# Patient Record
Sex: MALE | Race: BLACK OR AFRICAN AMERICAN | NOT HISPANIC OR LATINO
[De-identification: names, ages, dates, MRNs, and addresses within clinical notes are randomized per-mention and may not be internally consistent; named-entity substitution may affect disease eponyms.]

---
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
## 2021-01-03 ENCOUNTER — EMERGENCY (EMERGENCY)
Facility: HOSPITAL | Age: 21
LOS: 1 days | Discharge: HOME | End: 2021-01-03
Attending: EMERGENCY MEDICINE | Admitting: EMERGENCY MEDICINE
Payer: COMMERCIAL

## 2021-01-03 VITALS
DIASTOLIC BLOOD PRESSURE: 69 MMHG | TEMPERATURE: 98 F | RESPIRATION RATE: 18 BRPM | HEIGHT: 71 IN | HEART RATE: 57 BPM | OXYGEN SATURATION: 100 % | WEIGHT: 145.06 LBS | SYSTOLIC BLOOD PRESSURE: 106 MMHG

## 2021-01-03 DIAGNOSIS — M25.539 PAIN IN UNSPECIFIED WRIST: ICD-10-CM

## 2021-01-03 PROCEDURE — 99283 EMERGENCY DEPT VISIT LOW MDM: CPT | Mod: 25

## 2021-01-03 PROCEDURE — 29125 APPL SHORT ARM SPLINT STATIC: CPT | Mod: RT

## 2021-01-03 PROCEDURE — 73110 X-RAY EXAM OF WRIST: CPT

## 2021-01-03 PROCEDURE — 29125 APPL SHORT ARM SPLINT STATIC: CPT

## 2021-01-03 PROCEDURE — 73110 X-RAY EXAM OF WRIST: CPT | Mod: 26,RT

## 2021-01-03 PROCEDURE — 99284 EMERGENCY DEPT VISIT MOD MDM: CPT | Mod: 25

## 2021-01-03 NOTE — ED PROCEDURE NOTE - ATTENDING CONTRIBUTION TO CARE
Dr. Johnson: I performed a face to face bedside interview with patient regarding history of present illness, review of symptoms and past medical history. I completed an independent physical exam.  I have discussed patient's plan of care with PA.   I agree with note as stated above, having amended the EMR as needed to reflect my findings.   This includes HISTORY OF PRESENT ILLNESS, HIV, PAST MEDICAL/SURGICAL/FAMILY/SOCIAL HISTORY, ALLERGIES AND HOME MEDICATIONS, REVIEW OF SYSTEMS, PHYSICAL EXAM, and any PROGRESS NOTES during the time I functioned as the attending physician for this patient.

## 2021-01-03 NOTE — ED PROVIDER NOTE - CARE PROVIDER_API CALL
Filiberto Blank  ORTHOPAEDIC SURGERY  [address], [address]  [place] [address]  Phone: [phone]  Fax: [phone]  Follow Up Time:     Eliecer Trevizo  ORTHOPAEDIC SPORTS MEDICINE  [address]  [place] [address]  Phone: [phone]  Fax: [phone]  Follow Up Time:

## 2021-01-03 NOTE — ED PROVIDER NOTE - PATIENT PORTAL LINK FT
You can access the FollowMyHealth Patient Portal offered by [facility] by registering at the following website: [email or web address]. By joining [employer]’s FollowMyHealth portal, you will also be able to view your health information using other applications (apps) compatible with our system.

## 2021-01-03 NOTE — ED PROVIDER NOTE - NSFOLLOWUPINSTRUCTIONS_ED_ALL_ED_FT
Follow up with the orthopedic specialist within 2-3 days, call the number provided for an appointment     Take Tylenol or Motrin for pain. keep your splint clean and dry     Stay hydrated    Return to the ER if your symptoms worsen or for any other medical emergencies  ************      Radial Fracture       A radial fracture is a break in the radius bone. The radius is a bone in the forearm, on the same side as the thumb. The forearm is the part of the arm that is between the elbow and the wrist. A radial fracture near the wrist (distal radialfracture) is the most common type of broken arm. A fracture can also occur near the elbow (radial head fracture).      What are the causes?  The most common cause of a radial fracture is falling with the arm outstretched. Other causes include:  •An accident, such as a car or bike accident.      •A hard, direct hit to the forearm.        What increases the risk?  You may be at greater risk for a radial fracture if you:  •Are female.      •Are an older adult.      •Play contact sports.      •Have a condition that causes your bones to become thin and brittle (osteoporosis).        What are the signs or symptoms?  A radial fracture causes pain immediately after the injury. Other signs and symptoms may include:  •An abnormal bend or bump in the arm (deformity).      •Swelling.      •Bruising.      •Numbness or tingling in your arm and hand.      •Limited movement of your arm and hand.        How is this diagnosed?  This condition may be diagnosed based on:  •Your symptoms and medical history.      •A physical exam.      •An X-ray.        How is this treated?  Treatment depends on how severe your fracture is, where it is, and how the pieces of the broken bone line up with each other (alignment).  •The first step may be for you to wear a temporary splint for a few days, until your swelling goes down. After the swelling goes down, you may get a cast, get a different type of splint, or have surgery.      •If your broken bone is in good alignment, you will need to wear a splint or cast for up to 6 weeks.    •If your broken bone is not aligned (is displaced), your health care provider will need to align the bone pieces. After alignment, you will need to wear a splint or cast for up to 6 weeks. To align your broken bone, your health care provider may:  •Move the bones back into position without surgery (closed reduction).      •Perform surgery to align the fracture and fix the bone pieces into place with metal screws, plates, or wires (open reduction and internal fixation, ORIF).      •Perform surgery to align the fracture and fix the bone pieces into place with pins that are attached to a stabilizing bar outside your skin (external fixation).      Treatment may also include:  •Having your cast changed after 2–3 weeks.      •Physical therapy.      •Follow-up visits and X-rays to make sure you are healing.        Follow these instructions at home:    If you have a splint:     •Wear it as told by your health care provider. Remove it only as told by your health care provider.       •Loosen the splint if your fingers tingle, become numb, or turn cold and blue.       •Keep the splint clean and dry.      If you have a cast:     • Do not stick anything inside the cast to scratch your skin. Doing that increases your risk for infection.       •Check the skin around the cast every day. Tell your health care provider about any concerns.       •You may put lotion on dry skin around the edges of the cast. Do not put lotion on the skin underneath the cast.      •Keep the cast clean and dry.      Bathing     • Do not take baths, swim, or use a hot tub until your health care provider approves. Ask your health care provider if you may take showers. You may only be allowed to take sponge baths.    •If your splint or cast is not waterproof:  •Do not let it get wet.      •Cover it with a watertight covering when you take a bath or a shower.        Activity     •  Do not lift anything with your injured arm.      • Do not use the injured arm to support your body weight until your health care provider says that you can.      •Ask your health care provider what activities are safe for you during recovery, and ask what activities you need to avoid.        Managing pain, stiffness, and swelling    •If directed, put ice on painful areas:   •If you have a removable splint, remove it as told by your health care provider.       •Put ice in a plastic bag.       •Place a towel between your skin and the bag, or between your cast and the bag.      •Leave the ice on for 20 minutes, 2–3 times a day.        • Move your fingers often to avoid stiffness and to lessen swelling.       •Raise (elevate) your arm above the level of your heart while you are sitting or lying down.      General instructions     • Do not put pressure on any part of the cast or splint until it is fully hardened, if applicable. This may take several hours.       •Take over-the-counter and prescription medicines only as told by your health care provider.      •  Do not drive until your health care provider approves. You should not drive or use heavy machinery while taking prescription pain medicine.       • Do not use any products that contain nicotine or tobacco, such as cigarettes and e-cigarettes. These can delay bone healing. If you need help quitting, ask your health care provider.      •Keep all follow-up visits as told by your health care provider. This is important.        Contact a health care provider if you have:    •Pain that does not get better with medicine.      •Swelling that gets worse.      •A bad smell coming from your cast.        Get help right away if:    •You cannot move your fingers.      •You have severe pain.    •Your fingers or your hand:  •Become numb, cold, or pale.      •Turn a bluish color.          Summary    •A radial fracture is a break in the radius bone. The radius is in the forearm, on the same side as the thumb.      •Treatment depends on how severe your fracture is, where it is, and how the pieces of the broken bone line up with each other.      •A splint or cast may be needed to help the fracture heal. A more severe break may require surgery.      This information is not intended to replace advice given to you by your health care provider. Make sure you discuss any questions you have with your health care provider.

## 2021-01-03 NOTE — ED PROVIDER NOTE - ATTENDING CONTRIBUTION TO CARE
Dr. Johnson: I performed a face to face bedside interview with patient regarding history of present illness, review of symptoms and past medical history. I completed an independent physical exam.  I have discussed patient's plan of care with PA.   I agree with note as stated above, having amended the EMR as needed to reflect my findings.   This includes HISTORY OF PRESENT ILLNESS, HIV, PAST MEDICAL/SURGICAL/FAMILY/SOCIAL HISTORY, ALLERGIES AND HOME MEDICATIONS, REVIEW OF SYSTEMS, PHYSICAL EXAM, and any PROGRESS NOTES during the time I functioned as the attending physician for this patient.    see mdm

## 2021-01-03 NOTE — ED PROVIDER NOTE - PHYSICAL EXAMINATION
msk: +mild TTP over the volar aspect of the distal radius. No snuff not TTP. 2+ radial pulses b/l. no neurovasc compromise on exam  FROM of wrist

## 2021-01-03 NOTE — ED ADULT NURSE NOTE - OBJECTIVE STATEMENT
Pt arrives to ER c/o right wrist pain s/p fall snowboarding last Monday, negative deformity or swelling noted, pt able to move injured extremity purposefully

## 2021-01-03 NOTE — ED PROVIDER NOTE - CLINICAL SUMMARY MEDICAL DECISION MAKING FREE TEXT BOX
Dr. Johnson: 20M left hand dominant p/w pain to right distal wrist s/p foosh injury snowboarding last week, no other injuries, does not want anything for pain. On exam pt with mild deformity to right distal radius with ttp, neurovascularly intact, rest of exam unremarkable. Will splint and dc with ortho f/u

## 2021-12-30 NOTE — ED ADULT TRIAGE NOTE - BP NONINVASIVE DIASTOLIC (MM HG)
69 [Feeling Fatigued] : feeling fatigued [SOB] : shortness of breath [Joint Pain] : joint pain [Negative] : Psychiatric

## 2022-04-24 ENCOUNTER — EMERGENCY (EMERGENCY)
Facility: HOSPITAL | Age: 22
LOS: 1 days | Discharge: HOME | End: 2022-04-24
Attending: EMERGENCY MEDICINE | Admitting: EMERGENCY MEDICINE
Payer: SELF-PAY

## 2022-04-24 ENCOUNTER — TRANSCRIPTION ENCOUNTER (OUTPATIENT)
Age: 22
End: 2022-04-24

## 2022-04-24 VITALS
RESPIRATION RATE: 18 BRPM | HEIGHT: 71 IN | DIASTOLIC BLOOD PRESSURE: 72 MMHG | SYSTOLIC BLOOD PRESSURE: 122 MMHG | TEMPERATURE: 100 F | OXYGEN SATURATION: 98 % | WEIGHT: 142.2 LBS | HEART RATE: 78 BPM

## 2022-04-24 PROCEDURE — 96372 THER/PROPH/DIAG INJ SC/IM: CPT

## 2022-04-24 PROCEDURE — 99283 EMERGENCY DEPT VISIT LOW MDM: CPT

## 2022-04-24 PROCEDURE — 99284 EMERGENCY DEPT VISIT MOD MDM: CPT

## 2022-04-24 RX ORDER — KETOROLAC TROMETHAMINE 30 MG/ML
60 SYRINGE (ML) INJECTION ONCE
Refills: 0 | Status: DISCONTINUED | OUTPATIENT
Start: 2022-04-24 | End: 2022-04-24

## 2022-04-24 RX ORDER — DEXAMETHASONE 0.5 MG/5ML
10 ELIXIR ORAL ONCE
Refills: 0 | Status: COMPLETED | OUTPATIENT
Start: 2022-04-24 | End: 2022-04-24

## 2022-04-24 RX ADMIN — Medication 10 MILLIGRAM(S): at 21:16

## 2022-04-24 RX ADMIN — Medication 60 MILLIGRAM(S): at 21:16

## 2022-04-24 NOTE — ED PROVIDER NOTE - OBJECTIVE STATEMENT
22 y/o M with no reported PMH presents to the ED with c/o sorethroat x 4 days, worsened over the last 2 days. Pt went to [facility] today, they did a rapid covid and strep test, which were neg, Pt also was tested for mono, unknown results, he was dc'd with amoxil. Reports he had difficulty swallowing ibuprofen at home. Denies f/c, n/v/d, abd pain, rash, sick contacts or all other complaints

## 2022-04-24 NOTE — ED PROVIDER NOTE - CARE PROVIDERS DIRECT ADDRESSES
[email or web address] [email or web address]

## 2022-04-24 NOTE — ED PROVIDER NOTE - CLINICAL SUMMARY MEDICAL DECISION MAKING FREE TEXT BOX
20 y/o M with no reported PMH presents to the ED with c/o sorethroat x 4 days, worsened over the last 2 days. Pt went to [facility] today, they did a rapid covid and strep test, which were neg, Pt also was tested for mono, unknown results, he was dc'd with amoxil. Reports he had difficulty swallowing ibuprofen at home. Denies f/c, n/v/d, abd pain, rash, sick contacts or all other complaints. PE as noted above. pt is well appearing, will give IM toradol and decadron, and dc with instructions to continue his amoxil and f/u with ENT

## 2022-04-24 NOTE — ED PROVIDER NOTE - NS ED ATTENDING STATEMENT MOD
This was a shared visit with the THAD. I reviewed and verified the documentation and independently performed the documented:

## 2022-04-24 NOTE — ED PROVIDER NOTE - PATIENT PORTAL LINK FT
You can access the FollowMyHealth Patient Portal offered by [facility] by registering at the following website: [email or web address]. By joining [email or web address]’s FollowMyHealth portal, you will also be able to view your health information using other applications (apps) compatible with our system.

## 2022-04-24 NOTE — ED PROVIDER NOTE - THROAT FINDINGS
+b/l tonsillar exudates with mild tonsillar swelling (right>left). no kissing tonsils, drooling, uvular deviation, muffled voice or signs of PTA on exam/OROPHARYNGEAL EXUDATE/uvula midline/TONSILLAR SWELLING/NO DROOLING/NO STRIDOR

## 2022-04-24 NOTE — ED ADULT NURSE NOTE - NS ED NURSE LEVEL OF CONSCIOUSNESS ORIENTATION
Problem: Physical Therapy Goal  Goal: Physical Therapy Goal  Goals to be met by: 2018     Patient will increase functional independence with mobility by performin. Supine to sit with Modified Bacon  2. Sit to stand transfer with Minimal Assistance  3. Gait to e assessed.   4. Lower extremity exercise program x10 reps per handout, with supervision  5.  Bed to chair t/f with Min A  6.  W/C propulsion x 150' with Supervision     Outcome: Ongoing (interventions implemented as appropriate)  Pt making slow steady progress toward PT goals.  Difficulty maintaining NWB precautions L LE.  PT recommending SNF, but pt adamant about going home.  Continue with POC       Oriented - self; Oriented - place; Oriented - time

## 2022-04-24 NOTE — ED PROVIDER NOTE - ATTENDING APP SHARED VISIT CONTRIBUTION OF CARE
pt c/o throat pain worsening over last 4 days. no fever/chills. no cough. no chest pain/sob. seen at urgent care today , had neg covid and strep. mono test pending results. started on amoxil. couldn't swallow motrin due to pain tonight.     exam:   General: well appearing, NAD.   HEENT: eyes perrl, nose normal, bilat mild tonsillar swelling with erythema and small amt white exudate. tolerating secretions. no cervical DARRYL  cor: RRR, s1s2, 2+rad pulses.   lungs: ctabl, no resp distress.   abd: soft, ntnd.   neuro: a&ox3, cn2-12 intact, MILLER, 5/5 strength c nl sensation all extremities, nl coordination.   Skin: normal, no rash    AP: throat pain 4 days. started on amoxil. c/o diff swallowing. will give im toradol, decadron. po trial. f/u ent

## 2022-04-24 NOTE — ED ADULT NURSE NOTE - OBJECTIVE STATEMENT
21 yr old male ambulatory to the ED A&Ox4 presents with +sore throat. Pt reports symptoms started 2 days ago, getting progressively worse. Pt went to [facility] today and placed on antibiotic that he didn't start.  Pt reports diff swallowing and increased pain.  No drooling noted. +redness and swelling noted to RT side of throat. +exudate noted. Pt denies any fevers or chills. No acute resp distress noted. Resp even and unlabored. Skin warm, dry and normal for race.

## 2022-04-24 NOTE — ED PROVIDER NOTE - NSFOLLOWUPINSTRUCTIONS_ED_ALL_ED_FT
Follow up with the ENT specialist within 2-3 days.     Continue taking the amoxil antibiotics you were prescribed earlier today. Take over the counter motrin 600mg as needed for pain.     Stay hydrated    Return to the ER if your symptoms worsen, high fevers or for any other medical emergencies  *************    Pharyngitis    Upper body anatomy showing the pharynx.   Pharyngitis is a sore throat (pharynx). This is when there is redness, pain, and swelling in your throat. Most of the time, this condition gets better on its own. In some cases, you may need medicine.      Follow these instructions at home:  •Take over-the-counter and prescription medicines only as told by your doctor.  •If you were prescribed an antibiotic medicine, take it as told by your doctor. Do not stop taking the antibiotic even if you start to feel better.      •Do not give children aspirin. Aspirin has been linked to Reye syndrome.        •Drink enough water and fluids to keep your pee (urine) clear or pale yellow.      •Get a lot of rest.    •Rinse your mouth (gargle) with a salt-water mixture 3–4 times a day or as needed.  •To make a salt-water mixture, completely dissolve ½-1 tsp of salt in 1 cup of warm water.      •Do not swallow this mixture.        •If your doctor approves, you may use throat lozenges or sprays to soothe your throat.        Contact a doctor if:    •You have large, tender lumps in your neck.      •You have a rash.      •You cough up green, yellow-brown, or bloody spit.        Get help right away if:    •You have a stiff neck.      •You drool or cannot swallow liquids.      •You cannot drink or take medicines without throwing up.      •You have very bad pain that does not go away with medicine.      •You have problems breathing, and it is not from a stuffy nose.      •You have new pain and swelling in your knees, ankles, wrists, or elbows.        Summary    •Pharyngitis is a sore throat (pharynx). This is when there is redness, pain, and swelling in your throat.      •If you were prescribed an antibiotic medicine, take it as told by your doctor. Do not stop taking the antibiotic even if you start to feel better.      •Most of the time, pharyngitis gets better on its own. Sometimes, you may need medicine.      This information is not intended to replace advice given to you by your health care provider. Make sure you discuss any questions you have with your health care provider.

## 2022-04-24 NOTE — ED PROVIDER NOTE - CARE PROVIDER_API CALL
Wilson Leblanc)  Otolaryngology  [address] [place] [address]  Phone: [phone]  Fax: [phone]  Follow Up Time:     Dionte CannonPA)  Physician Assistant Services  [address] [place] [id number]  Phone: [phone]  Fax: [phone]  Follow Up Time:     Luís June  OTOLARYNGOLOGY  [address] [facility], [address] [place] [id number]  Phone: [phone]  Fax: [phone]  Follow Up Time:

## 2022-04-25 PROBLEM — Z78.9 OTHER SPECIFIED HEALTH STATUS: Chronic | Status: ACTIVE | Noted: 2021-01-03

## 2022-09-22 ENCOUNTER — EMERGENCY (EMERGENCY)
Facility: HOSPITAL | Age: 22
LOS: 1 days | Discharge: HOME | End: 2022-09-22
Attending: EMERGENCY MEDICINE | Admitting: EMERGENCY MEDICINE
Payer: COMMERCIAL

## 2022-09-22 VITALS
RESPIRATION RATE: 17 BRPM | SYSTOLIC BLOOD PRESSURE: 117 MMHG | HEIGHT: 71 IN | OXYGEN SATURATION: 98 % | WEIGHT: 139.99 LBS | TEMPERATURE: 101 F | DIASTOLIC BLOOD PRESSURE: 66 MMHG | HEART RATE: 112 BPM

## 2022-09-22 VITALS
SYSTOLIC BLOOD PRESSURE: 145 MMHG | DIASTOLIC BLOOD PRESSURE: 76 MMHG | TEMPERATURE: 103 F | HEART RATE: 99 BPM | RESPIRATION RATE: 16 BRPM | OXYGEN SATURATION: 98 %

## 2022-09-22 LAB
RAPID RVP RESULT: SIGNIFICANT CHANGE UP
SARS-COV-2 RNA SPEC QL NAA+PROBE: SIGNIFICANT CHANGE UP

## 2022-09-22 PROCEDURE — 0225U NFCT DS DNA&RNA 21 SARSCOV2: CPT

## 2022-09-22 PROCEDURE — 99284 EMERGENCY DEPT VISIT MOD MDM: CPT

## 2022-09-22 PROCEDURE — 99283 EMERGENCY DEPT VISIT LOW MDM: CPT

## 2022-09-22 RX ORDER — ACETAMINOPHEN 500 MG
650 TABLET ORAL ONCE
Refills: 0 | Status: COMPLETED | OUTPATIENT
Start: 2022-09-22 | End: 2022-09-22

## 2022-09-22 RX ORDER — IBUPROFEN 200 MG
600 TABLET ORAL ONCE
Refills: 0 | Status: COMPLETED | OUTPATIENT
Start: 2022-09-22 | End: 2022-09-22

## 2022-09-22 RX ADMIN — Medication 1 TABLET(S): at 19:29

## 2022-09-22 RX ADMIN — Medication 650 MILLIGRAM(S): at 19:05

## 2022-09-22 RX ADMIN — Medication 600 MILLIGRAM(S): at 19:06

## 2022-09-22 NOTE — ED ADULT TRIAGE NOTE - CHIEF COMPLAINT QUOTE
Patient to ED for complaints of nasal congestion, body aches, chills, fevers and throat pain since Tuesday.

## 2022-09-22 NOTE — ED PROVIDER NOTE - PATIENT PORTAL LINK FT
You can access the FollowMyHealth Patient Portal offered by [facility] by registering at the following website: [email or web address]. By joining [employer]’s FollowMyHealth portal, you will also be able to view your health information using other applications (apps) compatible with our system.

## 2022-09-22 NOTE — ED ADULT NURSE NOTE - OBJECTIVE STATEMENT
[facility]. Not vaccinated for COVID. c/o HA and sore throat starting Tuesday. body aches, tactile fever and vomiting on Wednesday. No meds taken at home. No recorded fevers PTA. Denies pmh, nkda, iutd (besides COVID).

## 2022-09-22 NOTE — ED PROVIDER NOTE - ATTENDING APP SHARED VISIT CONTRIBUTION OF CARE
[place] with JONATHAN Fisher. 22 yr old male with no pmhx presents with nasal congestion, body aches, chills, fevers and throat pain x2 days. Pt denies any sick contacts. Not covid vaccinated. Denies taking any pain meds today.    + b/l enlarged tonsils, exudates on exam   swab sent, will treat for acute tonsillitis   pt stable for dc    I performed a face to face bedside interview with patient regarding history of present illness, review of symptoms and past medical history. I completed an independent physical exam.  I have discussed the patient's plan of care with Physician Assistant (PA). I agree with note as stated above, having amended the EMR as needed to reflect my findings.   This includes History of Present Illness, HIV, Past Medical/Surgical/Family/Social History, Allergies and Home Medications, Review of Systems, Physical Exam, and any Progress Notes during the time I functioned as the attending physician for this patient.

## 2022-09-22 NOTE — ED PROVIDER NOTE - NSFOLLOWUPINSTRUCTIONS_ED_ALL_ED_FT
Take augmentin twice daily for the next 7 days   Take motrin 600mg every 6 hours as needed for pain   Take tylenol 650 mg every 4 hours as needed for pain   Drink plenty of fluids   Return to the ED if any worsening or persistent symptoms.       Tonsillitis    WHAT YOU NEED TO KNOW:    Tonsillitis is inflammation of your tonsils. Tonsils are the lumps of tissue on both sides of the back of your throat. Tonsils are part of your immune system. They help you fight infections. Recurrent tonsillitis is when you have tonsillitis many times in 1 year. Chronic tonsillitis is when you have a sore throat that lasts 3 months or longer.   Mouth Anatomy         DISCHARGE INSTRUCTIONS:    Call 911 for the following:   •You have trouble breathing because your tonsils are swollen.          Contact your healthcare provider if:   •You have a fever.      •Your pain gets worse or does not get better after you take pain medicine.      •Your sore throat is not better after you have finished antibiotic treatment.      •You have trouble sleeping and wake up trying to catch your breath.      •You have questions or concerns about your condition or care.      Medicines: You may need any of the following:   •Acetaminophen decreases pain and fever. It is available without a doctor's order. Ask how much to take and how often to take it. Follow directions. Read the labels of all other medicines you are using to see if they also contain acetaminophen, or ask your doctor or pharmacist. Acetaminophen can cause liver damage if not taken correctly.      •NSAIDs, such as ibuprofen, help decrease swelling, pain, and fever. This medicine is available with or without a doctor's order. NSAIDs can cause stomach bleeding or kidney problems in certain people. If you take blood thinner medicine, always ask your healthcare provider if NSAIDs are safe for you. Always read the medicine label and follow directions.      •Antibiotics help treat a bacterial infection.      •Take your medicine as directed. Contact your healthcare provider if you think your medicine is not helping or if you have side effects. Tell your provider if you are allergic to any medicine. Keep a list of the medicines, vitamins, and herbs you take. Include the amounts, and when and why you take them. Bring the list or the pill bottles to follow-up visits. Carry your medicine list with you in case of an emergency.      Rest when you feel it is needed. Slowly start to do more each day. Return to your daily activities as directed.     Drink liquids as directed: You may need to drink more liquid than usual to help prevent dehydration. Ask how much liquid to drink each day and which liquids are best for you.     Gargle with warm salt water: This may help decrease throat pain. Mix 1 teaspoon of salt in 8 ounces of warm water. Ask how often you should do this.    Prevent the spread of germs: Wash your hands often. Do not share food or drinks with anyone. You may be able to return to work when you feel better and your fever is gone for at least 24 hours.    Follow up with your doctor as directed: Write down your questions so you remember to ask them during your visits.       © Copyright [employer] 2022           back to top                          © Copyright [employer] 2022

## 2022-09-22 NOTE — ED PROVIDER NOTE - OBJECTIVE STATEMENT
22 yr old male with no pmhx presents with nasal congestion, body aches, chills, fevers and throat pain x2 days. Pt denies any sick cont 22 yr old male with no pmhx presents with nasal congestion, body aches, chills, fevers and throat pain x2 days. Pt denies any sick contacts. Not covid vaccinated. Denies taking any pain meds today.

## 2022-09-22 NOTE — ED PROVIDER NOTE - CLINICAL SUMMARY MEDICAL DECISION MAKING FREE TEXT BOX
22 yr old male with no pmhx presents with nasal congestion, body aches, chills, fevers and throat pain x2 days. Pt denies any sick contacts. Not covid vaccinated. Denies taking any pain meds today.    + b/l enlarged tonsils, exudates on exam   swab sent, will treat for acute tonsillitis   pt stable for dc

## 2022-09-25 ENCOUNTER — EMERGENCY (EMERGENCY)
Facility: HOSPITAL | Age: 22
LOS: 1 days | Discharge: HOME | End: 2022-09-25
Attending: INTERNAL MEDICINE | Admitting: INTERNAL MEDICINE
Payer: SELF-PAY

## 2022-09-25 VITALS
DIASTOLIC BLOOD PRESSURE: 70 MMHG | HEART RATE: 86 BPM | SYSTOLIC BLOOD PRESSURE: 111 MMHG | WEIGHT: 138.89 LBS | HEIGHT: 71 IN | TEMPERATURE: 99 F | RESPIRATION RATE: 18 BRPM | OXYGEN SATURATION: 99 %

## 2022-09-25 LAB
ALBUMIN SERPL ELPH-MCNC: 3.4 G/DL — SIGNIFICANT CHANGE UP (ref 3.3–5)
ALP SERPL-CCNC: 83 U/L — SIGNIFICANT CHANGE UP (ref 40–120)
ALT FLD-CCNC: 47 U/L — HIGH (ref 10–45)
ANION GAP SERPL CALC-SCNC: 8 MMOL/L — SIGNIFICANT CHANGE UP (ref 5–17)
AST SERPL-CCNC: 61 U/L — HIGH (ref 10–40)
BASOPHILS # BLD AUTO: 0 K/UL — SIGNIFICANT CHANGE UP (ref 0–0.2)
BASOPHILS NFR BLD AUTO: 0 % — SIGNIFICANT CHANGE UP (ref 0–2)
BILIRUB SERPL-MCNC: 0.4 MG/DL — SIGNIFICANT CHANGE UP (ref 0.2–1.2)
BUN SERPL-MCNC: 12 MG/DL — SIGNIFICANT CHANGE UP (ref 7–23)
CALCIUM SERPL-MCNC: 9.9 MG/DL — SIGNIFICANT CHANGE UP (ref 8.4–10.5)
CHLORIDE SERPL-SCNC: 89 MMOL/L — LOW (ref 96–108)
CO2 SERPL-SCNC: 33 MMOL/L — HIGH (ref 22–31)
CREAT SERPL-MCNC: 1.08 MG/DL — SIGNIFICANT CHANGE UP (ref 0.5–1.3)
EGFR: 101 ML/MIN/1.73M2 — SIGNIFICANT CHANGE UP
EOSINOPHIL # BLD AUTO: 0 K/UL — SIGNIFICANT CHANGE UP (ref 0–0.5)
EOSINOPHIL NFR BLD AUTO: 0 % — SIGNIFICANT CHANGE UP (ref 0–6)
GLUCOSE SERPL-MCNC: 112 MG/DL — HIGH (ref 70–99)
HCT VFR BLD CALC: 44.5 % — SIGNIFICANT CHANGE UP (ref 39–50)
HGB BLD-MCNC: 15.2 G/DL — SIGNIFICANT CHANGE UP (ref 13–17)
LYMPHOCYTES # BLD AUTO: 1.28 K/UL — SIGNIFICANT CHANGE UP (ref 1–3.3)
LYMPHOCYTES # BLD AUTO: 13 % — SIGNIFICANT CHANGE UP (ref 13–44)
MANUAL SMEAR VERIFICATION: SIGNIFICANT CHANGE UP
MCHC RBC-ENTMCNC: 28.3 PG — SIGNIFICANT CHANGE UP (ref 27–34)
MCHC RBC-ENTMCNC: 34.2 GM/DL — SIGNIFICANT CHANGE UP (ref 32–36)
MCV RBC AUTO: 82.7 FL — SIGNIFICANT CHANGE UP (ref 80–100)
MONOCYTES # BLD AUTO: 2.26 K/UL — HIGH (ref 0–0.9)
MONOCYTES NFR BLD AUTO: 23 % — HIGH (ref 2–14)
NEUTROPHILS # BLD AUTO: 6.28 K/UL — SIGNIFICANT CHANGE UP (ref 1.8–7.4)
NEUTROPHILS NFR BLD AUTO: 64 % — SIGNIFICANT CHANGE UP (ref 43–77)
NRBC # BLD: 0 /100 — SIGNIFICANT CHANGE UP (ref 0–0)
PLAT MORPH BLD: NORMAL — SIGNIFICANT CHANGE UP
PLATELET # BLD AUTO: 184 K/UL — SIGNIFICANT CHANGE UP (ref 150–400)
POTASSIUM SERPL-MCNC: 3.1 MMOL/L — LOW (ref 3.5–5.3)
POTASSIUM SERPL-SCNC: 3.1 MMOL/L — LOW (ref 3.5–5.3)
PROT SERPL-MCNC: 8.9 G/DL — HIGH (ref 6–8.3)
RBC # BLD: 5.38 M/UL — SIGNIFICANT CHANGE UP (ref 4.2–5.8)
RBC # FLD: 12.3 % — SIGNIFICANT CHANGE UP (ref 10.3–14.5)
RBC BLD AUTO: NORMAL — SIGNIFICANT CHANGE UP
SODIUM SERPL-SCNC: 130 MMOL/L — LOW (ref 135–145)
WBC # BLD: 9.82 K/UL — SIGNIFICANT CHANGE UP (ref 3.8–10.5)
WBC # FLD AUTO: 9.82 K/UL — SIGNIFICANT CHANGE UP (ref 3.8–10.5)

## 2022-09-25 PROCEDURE — 80053 COMPREHEN METABOLIC PANEL: CPT

## 2022-09-25 PROCEDURE — 99284 EMERGENCY DEPT VISIT MOD MDM: CPT | Mod: 25

## 2022-09-25 PROCEDURE — 36415 COLL VENOUS BLD VENIPUNCTURE: CPT

## 2022-09-25 PROCEDURE — 96374 THER/PROPH/DIAG INJ IV PUSH: CPT | Mod: XU

## 2022-09-25 PROCEDURE — 86308 HETEROPHILE ANTIBODY SCREEN: CPT

## 2022-09-25 PROCEDURE — 70491 CT SOFT TISSUE NECK W/DYE: CPT | Mod: MA

## 2022-09-25 PROCEDURE — 70491 CT SOFT TISSUE NECK W/DYE: CPT | Mod: 26,MA

## 2022-09-25 PROCEDURE — 99285 EMERGENCY DEPT VISIT HI MDM: CPT

## 2022-09-25 PROCEDURE — 87081 CULTURE SCREEN ONLY: CPT

## 2022-09-25 PROCEDURE — 96361 HYDRATE IV INFUSION ADD-ON: CPT

## 2022-09-25 PROCEDURE — 85025 COMPLETE CBC W/AUTO DIFF WBC: CPT

## 2022-09-25 PROCEDURE — 96375 TX/PRO/DX INJ NEW DRUG ADDON: CPT | Mod: XU

## 2022-09-25 RX ORDER — IBUPROFEN 200 MG
1 TABLET ORAL
Qty: 20 | Refills: 0
Start: 2022-09-25 | End: 2022-09-29

## 2022-09-25 RX ORDER — DEXAMETHASONE 0.5 MG/5ML
10 ELIXIR ORAL ONCE
Refills: 0 | Status: COMPLETED | OUTPATIENT
Start: 2022-09-25 | End: 2022-09-25

## 2022-09-25 RX ORDER — SODIUM CHLORIDE 9 MG/ML
1000 INJECTION INTRAMUSCULAR; INTRAVENOUS; SUBCUTANEOUS ONCE
Refills: 0 | Status: COMPLETED | OUTPATIENT
Start: 2022-09-25 | End: 2022-09-25

## 2022-09-25 RX ORDER — KETOROLAC TROMETHAMINE 30 MG/ML
30 SYRINGE (ML) INJECTION ONCE
Refills: 0 | Status: DISCONTINUED | OUTPATIENT
Start: 2022-09-25 | End: 2022-09-25

## 2022-09-25 RX ADMIN — Medication 102 MILLIGRAM(S): at 14:06

## 2022-09-25 RX ADMIN — Medication 100 MILLIGRAM(S): at 14:06

## 2022-09-25 RX ADMIN — SODIUM CHLORIDE 1000 MILLILITER(S): 9 INJECTION INTRAMUSCULAR; INTRAVENOUS; SUBCUTANEOUS at 14:07

## 2022-09-25 RX ADMIN — Medication 30 MILLIGRAM(S): at 14:06

## 2022-09-25 NOTE — ED PROVIDER NOTE - CARE PROVIDER_API CALL
Luís June  OTOLARYNGOLOGY  [address] [place] [address] [phone]  Phone: [phone]  Fax: [phone]  Follow Up Time:

## 2022-09-25 NOTE — ED PROVIDER NOTE - OBJECTIVE STATEMENT
21 y/o M with no pmh pw continues sore throat- seen here 4 days ago and started on Augmentin without improvement. Fever resolved, pain continues. Denies CP, SOB, ab pain, n/v/d, weakness. 23 y/o M with no pmh pw continues sore throat- seen here 4 days ago and started on Augmentin without improvement. RVP negative at that time. Fever resolved, pain continues. Denies CP, SOB, ab pain, n/v/d, weakness. Voice muffled. Cannot swallow. Accompanied with Mom.

## 2022-09-25 NOTE — ED PROVIDER NOTE - THROAT FINDINGS
THROAT RED OROPHARYNGEAL EXUDATE/THROAT RED/uvula midline/NO STRIDOR OROPHARYNGEAL EXUDATE/THROAT RED/uvula midline/TONSILLAR SWELLING/NO STRIDOR

## 2022-09-25 NOTE — ED PROVIDER NOTE - NSFOLLOWUPINSTRUCTIONS_ED_ALL_ED_FT
Follow up with your primary care doctor within 1-2 days or you can follow up with our family practice clinic at: [phone].   Stop Augmentin  Start Clindamycin 450mg three times a day (each tablet is 150mg- you will take 3 tablets 3 times a day)  Take Motrin 600mg every 6 hrs with food as needed for the pain  You received Decadron here (steroids). Start the Medrol dose pack in 2 days if your pain has not improved   Worsening, continued or ANY new concerning symptoms return to the emergency department.

## 2022-09-25 NOTE — ED PROVIDER NOTE - ATTENDING APP SHARED VISIT CONTRIBUTION OF CARE
89 y/o F with PMH of freq UTI, dementia, baseline A+AOx2, was BIB EMS from [facility] for increased lethargy and "low O2" seen here in ED 9/23 for similar complaints, diagnosed with pneumonia and started on Doxy. Patient alert, answering questions, following commands. Sat'ing at 96% on RA. Speaking in full sentences, breathing not labored. Accompanied with daughter who states she is surprised EMS was called- mom seems ok to her. UCX no growth and RVP neg from 9/23  Rectal temp 98.3  Plan: Will obtain sepsis labs, re-check CXR, RVP, cultures, give fluids and reassess  **update labs stable. CXR improved from last visit. Patient well appearing, sat'ing well on RA. Will DC back to NH. Strict ED return precautions discussed. Patient and daughter understands and agrees.  Dr. Maciel:  I have reviewed and discussed with the PA/ resident the case specifics, including the history, physical assessment, evaluation, conclusion, laboratory results, and medical plan. I agree with the contents, and conclusions. I have personally examined, and interviewed the patient.

## 2022-09-25 NOTE — ED ADULT NURSE NOTE - OBJECTIVE STATEMENT
22 year old male ambulatory to the ED A&Ox4 presents with +Sore throat..  Pt was seen in ED 4 days ago for fever and sore throat. Pt placed on Amoxicillin with no relief.  Pt returns with increased pain.  Pt muffled voice and patient noted to spit out saliva constantly.  No drooling or diff breathing noted. No stridor noted. Airway patent. +redness and exudate noted to throat.  Skin warm, dry and normal for race. 20 G IV placed to LT AC. Bloods obtained and sent. Safety maintained.

## 2022-09-25 NOTE — ED ADULT TRIAGE NOTE - CHIEF COMPLAINT QUOTE
I was here 4 days ago for fever and sore throat and was given Amoxil. Today I still have the sore throat"

## 2022-09-25 NOTE — ED PROVIDER NOTE - PATIENT PORTAL LINK FT
You can access the FollowMyHealth Patient Portal offered by [facility] by registering at the following website: [email or web address]. By joining [employer]’s FollowMyHealth portal, you will also be able to view your health information using other applications (apps) compatible with our system.

## 2022-09-25 NOTE — ED PROVIDER NOTE - CARE PROVIDERS DIRECT ADDRESSES
CORONARY ARTERY DISEASE:Yes  clinically stable. Patient is on optimal medical regimen ( see medication list above )  Patient is currently  asymptomatic from CAD. Current symptoms are most likely non cardiac.  -changes in  treatment:   no, on ASA &TOPROL  -Testing ordered:  no  [place] classification: 1 2017 at [facility]  Review of the processed reconstructed tomographic perfusion images in   multiple projections suggested a moderate size fixed inferoapical   perfusion abnormality on both stress and rest tomographic images   suggesting a small to moderate size area of previous myocardial damage. There was no definite suggestion of superimposed ischemia on the   myocardial perfusion imaging portion of this test.      CARDIOLITE 8/2021    Normal perfusion study with normal distribution in all coronal, short, and    horizontal axis. The observed defect is consistent with diaphragmatic attenuation. Normal LV function. LVEF is > 70 %. HTN:well controlled on current medical regimen, see list above. - changes in  treatment:   no, on Lopressor  ECHO 8/2021   Technically difficult study; poor acoustical windows. Left ventricular systolic function is normal.   Ejection fraction is visually estimated at 50-55%. Mild left ventricular hypertrophy. No significant valvular disease noted. No evidence of any pericardial effusion. CARDIOMYOPATHY:No  CONGESTIVE HEART FAILURE:No     VHD: No significant VHD noted    DYSLIPIDEMIA: yes,   Patient's profile is at / near [place],   Tolerating current medical regimen wellyes, takes Crestor  See most recent Lab values in Labs section above. Diabetes mellitis:yes, diet controlled  BS under good control yes,   Hgb A1c avilable yes,      TESTS ORDERED: None this visit     PREVIOUSLY ORDERED TESTS REVIEWED & DISCUSSED WITH THE PATIENT:     I personally reviewed & interpreted, all previously ordered tests as copied above. Latest Labs are pulled in to the note with dates. Labs, specially in Reference to Lipid profile, Cardiac testing in the form of Echo ( dated: ), stress tests ( dated: ) & other relevant cardiac testing reviewed with patient & recommendations made based on assessment of the results. Discussed role of Cardiac risk factors & effects + treatment of co morbidities with patient & advised accordingly. MEDICATIONS: List of medications patient is currently taking is reviewed in detail with the patient. Discussed any side effects or problems taking the medication. Recommend Continue present management & medications as listed. AFFIRMATION: I spent at least 20 minutes of time reviewing patient's history, previous & current medical problems & all Labs + testing. This includes chart prep even prior to the vosit. Various goals are discussed and multiple questions answered. Relevant concelling performed. Office follow up in six months. ,DirectAddress_Unknown

## 2022-09-25 NOTE — ED PROVIDER NOTE - CLINICAL SUMMARY MEDICAL DECISION MAKING FREE TEXT BOX
21 y/o M with no pmh pw continues sore throat- seen here 4 days ago and started on Augmentin without improvement. Fever resolved, pain continues. Denies CP, SOB, ab pain, n/v/d, weakness.   Plan: Will check 23 y/o M with no pmh pw continues sore throat- seen here 4 days ago and started on Augmentin without improvement. RVP negative at that time. Fever resolved, pain continues. Denies CP, SOB, ab pain, n/v/d, weakness. Voice muffled. Cannot swallow. Accompanied with Mom.   Plan: Will check basic labs, Strep culture, Monospot, check CT neck, give Toradol/Decadron/Clinda and reassess

## 2022-09-26 LAB
CULTURE RESULTS: SIGNIFICANT CHANGE UP
SPECIMEN SOURCE: SIGNIFICANT CHANGE UP

## 2022-09-27 LAB — HETEROPH AB TITR SER AGGL: NEGATIVE — SIGNIFICANT CHANGE UP

## 2023-02-27 ENCOUNTER — EMERGENCY (EMERGENCY)
Facility: HOSPITAL | Age: 23
LOS: 1 days | Discharge: HOME | End: 2023-02-27
Attending: EMERGENCY MEDICINE | Admitting: EMERGENCY MEDICINE
Payer: COMMERCIAL

## 2023-02-27 VITALS
WEIGHT: 149.91 LBS | DIASTOLIC BLOOD PRESSURE: 87 MMHG | HEART RATE: 97 BPM | HEIGHT: 72 IN | RESPIRATION RATE: 21 BRPM | SYSTOLIC BLOOD PRESSURE: 145 MMHG | OXYGEN SATURATION: 97 % | TEMPERATURE: 98 F

## 2023-02-27 VITALS
SYSTOLIC BLOOD PRESSURE: 140 MMHG | OXYGEN SATURATION: 98 % | DIASTOLIC BLOOD PRESSURE: 80 MMHG | HEART RATE: 98 BPM | TEMPERATURE: 98 F | RESPIRATION RATE: 20 BRPM

## 2023-02-27 LAB
FLUAV AG NPH QL: SIGNIFICANT CHANGE UP
FLUBV AG NPH QL: SIGNIFICANT CHANGE UP
RSV RNA NPH QL NAA+NON-PROBE: SIGNIFICANT CHANGE UP
SARS-COV-2 RNA SPEC QL NAA+PROBE: SIGNIFICANT CHANGE UP

## 2023-02-27 PROCEDURE — 87081 CULTURE SCREEN ONLY: CPT

## 2023-02-27 PROCEDURE — 99053 MED SERV 10PM-8AM 24 HR FAC: CPT

## 2023-02-27 PROCEDURE — 99284 EMERGENCY DEPT VISIT MOD MDM: CPT

## 2023-02-27 PROCEDURE — 87637 SARSCOV2&INF A&B&RSV AMP PRB: CPT

## 2023-02-27 PROCEDURE — 99283 EMERGENCY DEPT VISIT LOW MDM: CPT

## 2023-02-27 RX ORDER — IBUPROFEN 200 MG
1 TABLET ORAL
Qty: 30 | Refills: 0
Start: 2023-02-27

## 2023-02-27 RX ORDER — DIPHENHYDRAMINE HCL 50 MG
1 CAPSULE ORAL
Qty: 20 | Refills: 0
Start: 2023-02-27

## 2023-02-27 RX ORDER — DIPHENHYDRAMINE HCL 50 MG
50 CAPSULE ORAL ONCE
Refills: 0 | Status: COMPLETED | OUTPATIENT
Start: 2023-02-27 | End: 2023-02-27

## 2023-02-27 RX ORDER — IBUPROFEN 200 MG
600 TABLET ORAL ONCE
Refills: 0 | Status: COMPLETED | OUTPATIENT
Start: 2023-02-27 | End: 2023-02-27

## 2023-02-27 RX ORDER — LIDOCAINE 4 G/100G
10 CREAM TOPICAL ONCE
Refills: 0 | Status: COMPLETED | OUTPATIENT
Start: 2023-02-27 | End: 2023-02-27

## 2023-02-27 RX ADMIN — Medication 50 MILLIGRAM(S): at 05:58

## 2023-02-27 RX ADMIN — Medication 600 MILLIGRAM(S): at 05:57

## 2023-02-27 RX ADMIN — LIDOCAINE 10 MILLILITER(S): 4 CREAM TOPICAL at 05:58

## 2023-02-27 RX ADMIN — Medication 30 MILLILITER(S): at 05:58

## 2023-02-27 RX ADMIN — Medication 40 MILLIGRAM(S): at 05:57

## 2023-02-27 RX ADMIN — Medication 600 MILLIGRAM(S): at 06:21

## 2023-02-27 NOTE — ED PROVIDER NOTE - PATIENT PORTAL LINK FT
You can access the FollowMyHealth Patient Portal offered by [facility] by registering at the following website: [email or web address]. By joining [employer]’s FollowMyHealth portal, you will also be able to view your health information using other applications (apps) compatible with our system.

## 2023-02-27 NOTE — ED PROVIDER NOTE - PHYSICAL EXAMINATION
exam:   General: well appearing, NAD.   HEENT: eyes perrl, nose normal, OP : moderate pharyngeal erythema, NO edema. no tonsillar hypertrophy. no exudate or petechiae.  no cervical LA  cor: RRR, s1s2, 2+rad pulses.   lungs: ctabl, no resp distress.   abd: soft, ntnd.   neuro: a&ox3, cn2-12 intact, MILLER, 5/5 strength c nl sensation all extremities, nl coordination.   MSK: no extremity swelling.  Skin: normal, no rash

## 2023-02-27 NOTE — ED ADULT NURSE NOTE - NSIMPLEMENTINTERV_GEN_ALL_ED
Implemented All Universal Safety Interventions:  [place] to call system. Call bell, personal items and telephone within reach. Instruct patient to call for assistance. Room bathroom lighting operational. Non-slip footwear when patient is off stretcher. Physically safe environment: no spills, clutter or unnecessary equipment. Stretcher in lowest position, wheels locked, appropriate side rails in place.

## 2023-02-27 NOTE — ED PROVIDER NOTE - CARE PROVIDER_API CALL
Luís June  OTOLARYNGOLOGY  [address] [place] [address] [id number]  Phone: [phone]  Fax: [phone]  Follow Up Time: 1-3 Days

## 2023-02-27 NOTE — ED ADULT TRIAGE NOTE - CHIEF COMPLAINT QUOTE
pt states  he  ate  peaches one  hour  ago and since then he feel  his throat is closing up  pt  present with raspy voice ane he his shakey

## 2023-02-27 NOTE — ED PROVIDER NOTE - OBJECTIVE STATEMENT
22-year-old male with no significant past medical history complaining of throat pain starting about 1 hour prior to arrival.  Started as an initial mild itchy feeling, later became painful after drinking water.  Feels some swelling of throat.  No difficulty breathing.  No trauma to the throat or swallowed foreign object or caustic ingestion.  Patient states he ate a cup of peaches about 1 hour prior to symptoms.  Patient has had no prior allergies or reactions to peaches or any other food.  No associated skin itching or rash/hives.  Had mild nausea, vomited once nonbloody nonbilious.  No diarrhea no abdominal pain.  No dizziness.  No cough, fever chills, congestion.

## 2023-02-27 NOTE — ED PROVIDER NOTE - NSFOLLOWUPINSTRUCTIONS_ED_ALL_ED_FT
- take ibuprofen for pain or fever  - take prednisone daily for 4 more days  - take benadryl 25-50mg every 6 hrs for itching or hives or allergy symptoms    Follow Up in 1-3 Days with your own doctor or with  [address] [address]  Phone: [phone]      Pharyngitis    WHAT YOU NEED TO KNOW:    Pharyngitis, or sore throat, is inflammation of the tissues and structures in your pharynx (throat). Pharyngitis is most often caused by bacteria. It may also be caused by a cold or flu virus. Other causes include smoking, allergies, or acid reflux.     DISCHARGE INSTRUCTIONS:    Call 911 for any of the following:   •You have trouble breathing or swallowing because your throat is swollen or sore.          Return to the emergency department if:   •You are drooling because it hurts too much to swallow.      •Your fever is higher than 102°F (39°C) or lasts longer than 3 days.      •You are confused.      •You taste blood in your throat.      Contact your healthcare provider if:   •Your throat pain gets worse.      •You have a painful lump in your throat that does not go away after 5 days.      •Your symptoms do not improve after 5 days.      •You have questions or concerns about your condition or care.      Medicines: Viral pharyngitis will go away on its own without treatment. Your sore throat should start to feel better in 3 to 5 days for both viral and bacterial infections. You may need any of the following:   •Antibiotics treat a bacterial infection.      •NSAIDs, such as ibuprofen, help decrease swelling, pain, and fever. NSAIDs can cause stomach bleeding or kidney problems in certain people. If you take blood thinner medicine, always ask your healthcare provider if NSAIDs are safe for you. Always read the medicine label and follow directions.      •Acetaminophen decreases pain and fever. It is available without a doctor's order. Ask how much to take and how often to take it. Follow directions. Acetaminophen can cause liver damage if not taken correctly.      •Take your medicine as directed. Contact your healthcare provider if you think your medicine is not helping or if you have side effects. Tell him or her if you are allergic to any medicine. Keep a list of the medicines, vitamins, and herbs you take. Include the amounts, and when and why you take them. Bring the list or the pill bottles to follow-up visits. Carry your medicine list with you in case of an emergency.      Manage your symptoms:   •Gargle salt water. Mix ¼ teaspoon salt in an 8 ounce glass of warm water and gargle. This may help decrease swelling in your throat.      •Drink liquids as directed. You may need to drink more liquids than usual. Liquids may help soothe your throat and prevent dehydration. Ask how much liquid to drink each day and which liquids are best for you.      •Use a cool-steam humidifier to help moisten the air in your room and calm your cough.      •Soothe your throat with cough drops, ice, soft foods, or popsicles.      Prevent the spread of pharyngitis: Cover your mouth and nose when you cough or sneeze. Do not share food or drinks. Wash your hands often. Use soap and water. If soap and water are unavailable, use an alcohol based hand .     Follow up with your doctor as directed: Write down your questions so you remember to ask them during your visits.

## 2023-02-27 NOTE — ED PROVIDER NOTE - CLINICAL SUMMARY MEDICAL DECISION MAKING FREE TEXT BOX
22-year-old male with no significant past medical history complaining of throat pain starting about 1 hour prior to arrival.  Started as an initial mild itchy feeling, later became painful after drinking water.  Feels some swelling of throat.  No difficulty breathing.  No trauma to the throat or swallowed foreign object or caustic ingestion.  Patient states he ate a cup of peaches about 1 hour prior to symptoms.  Patient has had no prior allergies or reactions to peaches or any other food.  No associated skin itching or rash/hives.  Had mild nausea, vomited once nonbloody nonbilious.  No diarrhea no abdominal pain.  No dizziness.  No cough, fever chills, congestion.    Patient appears in no distress, vital signs normal, unremarkable.  Pharynx appears mild to moderately erythematous without exudate or tonsillar hypertrophy.  No pharyngeal edema.  No cervical lymphadenopathy.  No rash/hives.  Main symptom is throat pain with erythematous pharynx on exam.  Likely pharyngitis, viral versus strep/bacterial.  Will check throat culture.  Will not treat with antibiotics for strep at this point as Centor score low (1).  Less likely allergic reaction as primary symptom pain.  Also has not had no prior allergies to foods or peaches.  Did have some itching of throat with nausea and vomiting x1 . will give prednisone and benadryl for sxs.

## 2023-02-28 LAB
CULTURE RESULTS: SIGNIFICANT CHANGE UP
SPECIMEN SOURCE: SIGNIFICANT CHANGE UP

## 2023-05-17 ENCOUNTER — EMERGENCY (EMERGENCY)
Facility: HOSPITAL | Age: 23
LOS: 1 days | Discharge: HOME | End: 2023-05-17
Attending: EMERGENCY MEDICINE | Admitting: EMERGENCY MEDICINE
Payer: SELF-PAY

## 2023-05-17 VITALS
TEMPERATURE: 98 F | SYSTOLIC BLOOD PRESSURE: 119 MMHG | HEART RATE: 71 BPM | WEIGHT: 149.91 LBS | DIASTOLIC BLOOD PRESSURE: 81 MMHG | OXYGEN SATURATION: 100 % | RESPIRATION RATE: 18 BRPM | HEIGHT: 72 IN

## 2023-05-17 PROCEDURE — 99284 EMERGENCY DEPT VISIT MOD MDM: CPT

## 2023-05-17 PROCEDURE — 99283 EMERGENCY DEPT VISIT LOW MDM: CPT

## 2023-05-17 RX ORDER — IBUPROFEN 200 MG
600 TABLET ORAL ONCE
Refills: 0 | Status: COMPLETED | OUTPATIENT
Start: 2023-05-17 | End: 2023-05-17

## 2023-05-17 RX ORDER — IBUPROFEN 200 MG
1 TABLET ORAL
Qty: 12 | Refills: 0
Start: 2023-05-17 | End: 2023-05-19

## 2023-05-17 RX ADMIN — Medication 1 TABLET(S): at 22:31

## 2023-05-17 RX ADMIN — Medication 600 MILLIGRAM(S): at 22:31

## 2023-05-17 NOTE — ED PROVIDER NOTE - OBJECTIVE STATEMENT
22 yr old male with no pmhx presents with right upper dental pain x 1 day. + chills. No fever. No recent dental work. Denies any other symptoms.

## 2023-05-17 NOTE — ED ADULT NURSE NOTE - CAS DISCH TRANSFER METHOD
For information on Fall & Injury Prevention, visit [email or web address]
Private car

## 2023-05-17 NOTE — ED PROVIDER NOTE - ATTENDING CONTRIBUTION TO CARE
Patient complaining of right upper tooth pain today.  No new trauma.  Patient states he has a right upper molar tooth has always been broken.  No fever chills no discharge    exam:   General: uncomfortable. NAD  HEENT: eyes perrl, nose normal, OP no erythema/exudate/swelling. R upper wisdom tooth decayed, large defect. tender. no dc . mild surrounding gum swelling  cor: RRR, s1s2, 2+rad pulses.   lungs: ctabl, no resp distress.   neuro: a&ox3, cn2-12 intact, MILLER, 5/5 strength c nl sensation all extremities, nl coordination.   MSK: no extremity swelling.  Skin: normal, no rash    AP: Patient with right upper wisdom tooth pain/decay.  Likely infection.  Treat with Augmentin/, Motrin.  Dental follow-up

## 2023-05-17 NOTE — ED ADULT NURSE NOTE - OBJECTIVE STATEMENT
Patient came from  home with complaint of tooth pain. Patient denies any fever, headache, nausea or vomit. Denies any recent dental work.

## 2023-05-17 NOTE — ED PROVIDER NOTE - CLINICAL SUMMARY MEDICAL DECISION MAKING FREE TEXT BOX
22 yr old male with no pmhx presents with right upper dental pain x 1 day. + chills. No fever. No recent dental work. Denies any other symptoms.    tenderness to right upper dental area with some swelling   Augmentin, motrin and fu with dental

## 2023-05-17 NOTE — ED PROVIDER NOTE - PATIENT PORTAL LINK FT
You can access the FollowMyHealth Patient Portal offered by [facility] by registering at the following website: [email or web address]. By joining [employer]’s FollowMyHealth portal, you will also be able to view your health information using other applications (apps) compatible with our system.

## 2023-05-17 NOTE — ED ADULT NURSE NOTE - NSFALLUNIVINTERV_ED_ALL_ED
Bed/Stretcher in lowest position, wheels locked, appropriate side rails in place/Call bell, personal items and telephone in reach/Instruct patient to call for assistance before getting out of bed/chair/stretcher/Non-slip footwear applied when patient is off stretcher/[place] to call system/Physically safe environment - no spills, clutter or unnecessary equipment/Purposeful proactive rounding/Room/bathroom lighting operational, light cord in reach

## 2023-05-17 NOTE — ED PROVIDER NOTE - NS ED ATTENDING STATEMENT MOD
I have seen and examined this patient and fully participated in the care of this patient as the teaching attending.  The service was shared with the THAD.  I reviewed and verified the documentation and independently performed the documented:

## 2024-02-05 NOTE — ED PROVIDER NOTE - ATTENDING SHARED VISIT SELECTORS
[No Acute Distress] : no acute distress [No Respiratory Distress] : no respiratory distress  [Normal] : affect was normal and insight and judgment were intact [de-identified] : no TTP over forehead History/Exam/Medical Decision Making

## 2024-07-31 NOTE — ED PROVIDER NOTE - CARE PLAN
Anticoagulation Progress Note    Indication: AVR- mechanical valve  Goal INR: 2.0-3.0; changed from 2.5-3.5 9/2021  Duration: long term  Order Expiration Date: 1/5/25  Ordering MD: Kashmir Sommer MD  Supervising Provider:   Pertinent History: Hx Scleroderma, Long term current use of anticoagulant therapy, Pacemaker-dependent due to native cardiac rhythm insufficient to support life, Pulmonary hypertension associated with vasculitis.    Assessment-telephonic- venous INR with HHRN today; spoke to the pt for an assessment new dosing provided;and with Kriss's phone number is: [phone]     Yes No   []  [x] Missed doses:    []  [x] Extra doses:    []  [x] Significant medication changes (RX, OTC, Herbal):    [x]  [] High-risk maintenance medications: spironolactone   [x]  [] Vitamin K / dietary changes (Vitamin K goal: 3 cup/week): occasional ensure   []  [x] Bleeding / bruising:    []  [x] Falls/injuries:    []  [x] Acute illness:    []  [x] Alcohol intake:      []  [x] Procedures / hospitalization / ER visits:    [x]  [] Other: using O2; pt states palliative will be seeing her monthly; unable to use INR meter at this time r/t fingers being calloused and contracted; will order venous INRs to be drawn with HHRN for now; messaged HHRN Kriss via securechat           Plan (3 mg tan colored tablets, 1 mg tablets)  INR 2.4  The INR result for today is therapeutic based on the INR goal 2.0-3.0.    Etiology: n/a  Recommended dose: 1.5 mg daily (10.5 mg/week)   Follow-Up: 2 week with HHRN, standing venous INR ordered  Comments: reports having a 24 hour caregiver     Counseling-      Counseled about signs/symptoms of bruising/bleeding and appropriate management  Counseled about signs/symptoms of thrombosis and/or stroke and when to seek emergent care  Stressed importance of compliance with exact recommended dosage regimen  Stressed importance of compliance with consistent vitamin K intake  Stressed importance of  adherence with recommended lab monitoring  Instructed to notify clinic about medication changes or health status changes  Instructed to notify clinic about scheduled procedures    Provided home health nurse with verbal dosing instructions, home health nurse endorsed plan to patient/caregiver who verbalized understanding.  and Provided patient/caregiver with verbal dosing instructions, patient/caregiver verbalized understanding.    Principal Discharge DX:	Closed fracture of distal end of right radius, unspecified fracture morphology, initial encounter

## 2025-06-17 ENCOUNTER — EMERGENCY (EMERGENCY)
Facility: HOSPITAL | Age: 25
LOS: 1 days | End: 2025-06-17
Attending: EMERGENCY MEDICINE | Admitting: EMERGENCY MEDICINE
Payer: MEDICAID

## 2025-06-17 VITALS
SYSTOLIC BLOOD PRESSURE: 129 MMHG | HEIGHT: 72 IN | DIASTOLIC BLOOD PRESSURE: 83 MMHG | TEMPERATURE: 98 F | WEIGHT: 154.98 LBS | RESPIRATION RATE: 18 BRPM | OXYGEN SATURATION: 100 % | HEART RATE: 76 BPM

## 2025-06-17 LAB
ALBUMIN SERPL ELPH-MCNC: 3.8 G/DL — SIGNIFICANT CHANGE UP (ref 3.3–5)
ALP SERPL-CCNC: 85 U/L — SIGNIFICANT CHANGE UP (ref 40–120)
ALT FLD-CCNC: 24 U/L — SIGNIFICANT CHANGE UP (ref 10–45)
ANION GAP SERPL CALC-SCNC: 8 MMOL/L — SIGNIFICANT CHANGE UP (ref 5–17)
AST SERPL-CCNC: 30 U/L — SIGNIFICANT CHANGE UP (ref 10–40)
BASOPHILS # BLD AUTO: 0.03 K/UL — SIGNIFICANT CHANGE UP (ref 0–0.2)
BASOPHILS NFR BLD AUTO: 0.8 % — SIGNIFICANT CHANGE UP (ref 0–2)
BILIRUB SERPL-MCNC: 1.2 MG/DL — SIGNIFICANT CHANGE UP (ref 0.2–1.2)
BUN SERPL-MCNC: 12 MG/DL — SIGNIFICANT CHANGE UP (ref 7–23)
CALCIUM SERPL-MCNC: 9.5 MG/DL — SIGNIFICANT CHANGE UP (ref 8.4–10.5)
CHLORIDE SERPL-SCNC: 103 MMOL/L — SIGNIFICANT CHANGE UP (ref 96–108)
CO2 SERPL-SCNC: 28 MMOL/L — SIGNIFICANT CHANGE UP (ref 22–31)
CREAT SERPL-MCNC: 0.85 MG/DL — SIGNIFICANT CHANGE UP (ref 0.5–1.3)
EGFR: 125 ML/MIN/1.73M2 — SIGNIFICANT CHANGE UP
EGFR: 125 ML/MIN/1.73M2 — SIGNIFICANT CHANGE UP
EOSINOPHIL # BLD AUTO: 0.1 K/UL — SIGNIFICANT CHANGE UP (ref 0–0.5)
EOSINOPHIL NFR BLD AUTO: 2.6 % — SIGNIFICANT CHANGE UP (ref 0–6)
GLUCOSE SERPL-MCNC: 133 MG/DL — HIGH (ref 70–99)
HCT VFR BLD CALC: 42.4 % — SIGNIFICANT CHANGE UP (ref 39–50)
HGB BLD-MCNC: 14 G/DL — SIGNIFICANT CHANGE UP (ref 13–17)
IMM GRANULOCYTES NFR BLD AUTO: 0.3 % — SIGNIFICANT CHANGE UP (ref 0–0.9)
LYMPHOCYTES # BLD AUTO: 1.2 K/UL — SIGNIFICANT CHANGE UP (ref 1–3.3)
LYMPHOCYTES # BLD AUTO: 31.5 % — SIGNIFICANT CHANGE UP (ref 13–44)
MAGNESIUM SERPL-MCNC: 1.6 MG/DL — SIGNIFICANT CHANGE UP (ref 1.6–2.6)
MCHC RBC-ENTMCNC: 27.7 PG — SIGNIFICANT CHANGE UP (ref 27–34)
MCHC RBC-ENTMCNC: 33 G/DL — SIGNIFICANT CHANGE UP (ref 32–36)
MCV RBC AUTO: 84 FL — SIGNIFICANT CHANGE UP (ref 80–100)
MONOCYTES # BLD AUTO: 0.48 K/UL — SIGNIFICANT CHANGE UP (ref 0–0.9)
MONOCYTES NFR BLD AUTO: 12.6 % — SIGNIFICANT CHANGE UP (ref 2–14)
NEUTROPHILS # BLD AUTO: 1.99 K/UL — SIGNIFICANT CHANGE UP (ref 1.8–7.4)
NEUTROPHILS NFR BLD AUTO: 52.2 % — SIGNIFICANT CHANGE UP (ref 43–77)
NRBC BLD AUTO-RTO: 0 /100 WBCS — SIGNIFICANT CHANGE UP (ref 0–0)
PLATELET # BLD AUTO: 201 K/UL — SIGNIFICANT CHANGE UP (ref 150–400)
POTASSIUM SERPL-MCNC: 3.3 MMOL/L — LOW (ref 3.5–5.3)
POTASSIUM SERPL-SCNC: 3.3 MMOL/L — LOW (ref 3.5–5.3)
PROT SERPL-MCNC: 8.1 G/DL — SIGNIFICANT CHANGE UP (ref 6–8.3)
RBC # BLD: 5.05 M/UL — SIGNIFICANT CHANGE UP (ref 4.2–5.8)
RBC # FLD: 13.1 % — SIGNIFICANT CHANGE UP (ref 10.3–14.5)
SODIUM SERPL-SCNC: 139 MMOL/L — SIGNIFICANT CHANGE UP (ref 135–145)
WBC # BLD: 3.81 K/UL — SIGNIFICANT CHANGE UP (ref 3.8–10.5)
WBC # FLD AUTO: 3.81 K/UL — SIGNIFICANT CHANGE UP (ref 3.8–10.5)

## 2025-06-17 PROCEDURE — 36415 COLL VENOUS BLD VENIPUNCTURE: CPT

## 2025-06-17 PROCEDURE — 74177 CT ABD & PELVIS W/CONTRAST: CPT | Mod: 26

## 2025-06-17 PROCEDURE — 85025 COMPLETE CBC W/AUTO DIFF WBC: CPT

## 2025-06-17 PROCEDURE — 99285 EMERGENCY DEPT VISIT HI MDM: CPT

## 2025-06-17 PROCEDURE — 80053 COMPREHEN METABOLIC PANEL: CPT

## 2025-06-17 PROCEDURE — 99284 EMERGENCY DEPT VISIT MOD MDM: CPT | Mod: 25

## 2025-06-17 PROCEDURE — 83735 ASSAY OF MAGNESIUM: CPT

## 2025-06-17 PROCEDURE — 96374 THER/PROPH/DIAG INJ IV PUSH: CPT | Mod: XU

## 2025-06-17 PROCEDURE — 74177 CT ABD & PELVIS W/CONTRAST: CPT

## 2025-06-17 RX ORDER — ONDANSETRON HCL/PF 4 MG/2 ML
1 VIAL (ML) INJECTION
Qty: 20 | Refills: 0
Start: 2025-06-17 | End: 2025-06-21

## 2025-06-17 RX ORDER — ACETAMINOPHEN 500 MG/5ML
1000 LIQUID (ML) ORAL ONCE
Refills: 0 | Status: COMPLETED | OUTPATIENT
Start: 2025-06-17 | End: 2025-06-17

## 2025-06-17 RX ADMIN — Medication 400 MILLIGRAM(S): at 13:38

## 2025-06-17 RX ADMIN — Medication 40 MILLIEQUIVALENT(S): at 13:37

## 2025-06-17 RX ADMIN — Medication 1000 MILLILITER(S): at 12:39

## 2025-06-17 NOTE — ED PROVIDER NOTE - PATIENT PORTAL LINK FT
You can access the FollowMyHealth Patient Portal offered by [facility] by registering at the following website: [email or web address]. By joining [employer]’s FollowMyHealth portal, you will also be able to view your health information using other applications (apps) compatible with our system.

## 2025-06-17 NOTE — ED PROVIDER NOTE - CARE PROVIDER_API CALL
Yossi Bean  Gastroenterology  [address] [place] [address], [address]  [place] [address]  Phone: [phone]  Fax: [phone]  Follow Up Time: Routine

## 2025-06-17 NOTE — ED ADULT NURSE NOTE - OBJECTIVE STATEMENT
Pt came from home with c/o bright red blood noted in stool since yesterday. Pt also with c/o abdominal cramping. Denies nausea, diarrhea or urinary symptoms.

## 2025-06-17 NOTE — ED ADULT NURSE NOTE - CAS EDP DISCH TYPE
Show Applicator Variable?: Yes
Detail Level: Detailed
Consent: The patient's consent was obtained including but not limited to risks of crusting, scabbing, blistering, scarring, darker or lighter pigmentary change, recurrence, incomplete removal and infection.
Post-Care Instructions: I reviewed with the patient in detail post-care instructions. Patient is to wear sunprotection, and avoid picking at any of the treated lesions. Pt may apply Vaseline to crusted or scabbing areas.
Number Of Freeze-Thaw Cycles: 2 freeze-thaw cycles
Render Post-Care Instructions In Note?: no
Application Tool (Optional): Liquid Nitrogen Sprayer
Home

## 2025-06-17 NOTE — ED PROVIDER NOTE - CLINICAL SUMMARY MEDICAL DECISION MAKING FREE TEXT BOX
24-year-old male with no past significant medical history presents with vomiting and bloody diarrhea for 2 days, lab, CT, meds

## 2025-06-17 NOTE — ED PROVIDER NOTE - OBJECTIVE STATEMENT
Alonso Correa, a 24-year-old male, presents with bloody stools, vomiting with blood, and abdominal pain that started last night.  - Chief Complaint (CC) : Bloody stools and vomiting with blood since last night  - History of Present Illness : Alonso Correa, a 24-year-old male, presents to the clinic with a chief complaint of bloody stools and vomiting with blood that started last night. The patient reports that his stools were bloody and yellow in color. He also experienced vomiting this morning, which contained a small amount of blood. The patient describes a constant, mild pain in the left lower quadrant of his abdomen. This is the first time he has experienced these symptoms. He denies any fever or chills. The patient is concerned about these symptoms, particularly the presence of blood in both his stool and vomit.  - Past Medical History : The patient reports no significant past medical history.  - Past Surgical History : The patient denies any previous surgeries in his chest or abdomen.  - Family History :  - Social History :  - Smokes marijuana    - Vapes    - Denies cigarette smoking    - Denies alcohol or drug abuse    - Review of Systems :  - General : Denies fever or chills  - Neurological : Not mentioned  - Musculoskeletal : Not mentioned  - Cardiovascular : Not mentioned  - Respiratory : Not mentioned  - Gastrointestinal : Bloody and yellow stools, vomiting with blood, mild constant abdominal pain in the left lower quadrant  - Genitourinary : Not mentioned  - Integumentary : Not mentioned  - Psychiatric : Not mentioned  - Medications : The patient is not currently taking any medications.  - Allergies : The patient denies any drug allergies.  Objective:  - Diagnostic Results : No diagnostic results are mentioned in the provided information.  - Vital Signs : No vital signs are mentioned in the provided information.  - Physical Examination (PE) : The patient reports mild, constant pain in the left lower quadrant of the abdomen. No other physical examination findings are mentioned in the provided information.  Assessment and Plan:  - Acute Gastrointestinal Bleeding : The patient presents with acute onset of bloody stools and hematemesis, indicating active gastrointestinal bleeding. The presence of blood in both upper and lower GI tract suggests a potentially serious condition that requires immediate evaluation and management.  - Immediate referral to the emergency department for further evaluation and management    - Complete blood count (CBC) to assess for anemia and platelet count    - Coagulation studies (PT, INR, PTT)    - Upper endoscopy and colonoscopy to identify the source of bleeding    - Intravenous fluid resuscitation as needed    - Possible blood transfusion depending on hemoglobin levels    - Proton pump inhibitor therapy to reduce gastric acid production    - Patient education on the seriousness of the condition and the need for immediate medical attention    - Follow-up appointment after hospital discharge to review findings and adjust treatment plan    - Abdominal Pain : The patient reports mild, constant pain in the left lower quadrant, which may be related to the gastrointestinal bleeding or indicate an underlying condition such as diverticulitis or inflammatory bowel disease.  - Abdominal CT scan to evaluate for potential causes of abdominal pain and bleeding    - Pain management with acetaminophen if needed (avoid NSAIDs due to potential GI irritation)    - Reassess pain levels after initial treatment for GI bleeding    - Substance Use : The patient reports smoking marijuana and vaping, which may contribute to respiratory and gastrointestinal issues.  -  patient on the health risks associated with marijuana use and vaping    - Recommend cessation of both marijuana and vaping    - Provide resources for smoking cessation programs and support groups    - Follow-up to assess progress with substance use reduction or cessation

## 2025-06-17 NOTE — ED ADULT NURSE NOTE - NSFALLUNIVINTERV_ED_ALL_ED
Bed/Stretcher in lowest position, wheels locked, appropriate side rails in place/Call bell, personal items and telephone in reach/Instruct patient to call for assistance before getting out of bed/chair/stretcher/Non-slip footwear applied when patient is off stretcher/[place] to call system/Physically safe environment - no spills, clutter or unnecessary equipment/Purposeful proactive rounding/Room/bathroom lighting operational, light cord in reach

## 2025-06-28 ENCOUNTER — EMERGENCY (EMERGENCY)
Facility: HOSPITAL | Age: 25
LOS: 1 days | End: 2025-06-28
Attending: EMERGENCY MEDICINE | Admitting: EMERGENCY MEDICINE
Payer: MEDICAID

## 2025-06-28 VITALS
RESPIRATION RATE: 20 BRPM | SYSTOLIC BLOOD PRESSURE: 118 MMHG | TEMPERATURE: 98 F | HEIGHT: 72 IN | WEIGHT: 160.06 LBS | DIASTOLIC BLOOD PRESSURE: 71 MMHG | OXYGEN SATURATION: 98 % | HEART RATE: 78 BPM

## 2025-06-28 VITALS
SYSTOLIC BLOOD PRESSURE: 122 MMHG | RESPIRATION RATE: 18 BRPM | HEART RATE: 79 BPM | OXYGEN SATURATION: 98 % | DIASTOLIC BLOOD PRESSURE: 79 MMHG

## 2025-06-28 PROCEDURE — 99285 EMERGENCY DEPT VISIT HI MDM: CPT

## 2025-06-28 PROCEDURE — 70450 CT HEAD/BRAIN W/O DYE: CPT | Mod: 26

## 2025-06-28 PROCEDURE — 93005 ELECTROCARDIOGRAM TRACING: CPT

## 2025-06-28 PROCEDURE — 99285 EMERGENCY DEPT VISIT HI MDM: CPT | Mod: 25

## 2025-06-28 PROCEDURE — 70450 CT HEAD/BRAIN W/O DYE: CPT

## 2025-06-28 PROCEDURE — 93010 ELECTROCARDIOGRAM REPORT: CPT

## 2025-06-28 NOTE — ED ADULT TRIAGE NOTE - CHIEF COMPLAINT QUOTE
Pt brought into ED by friend after Pt hit his head and "knocked out for 10 seconds." Pt's friend states they were out drinking when patient fell and hit head on a metal table.

## 2025-06-28 NOTE — ED PROVIDER NOTE - NSFOLLOWUPINSTRUCTIONS_ED_ALL_ED_FT
Follow Up in 1-3 Days with your own doctor or with  [facility]  [address] [place] [address]  Phone: [phone]    - Follow-up with ENT doctor this week regarding sinus disease/congestion noted on CAT scan    Head Injury    WHAT YOU NEED TO KNOW:    A head injury can include your scalp, face, skull, or brain and range from mild to severe. Effects can appear immediately after the injury or develop later. The effects may last a short time or be permanent. Healthcare providers may want to check your recovery over time. Treatment may change as you recover or develop new health problems from the head injury.    DISCHARGE INSTRUCTIONS:    Call your local emergency number (911 in the ), or have someone else call if:     You cannot be woken.      You have a seizure.      You stop responding to others or you faint.      You have blurry or double vision.      Your speech becomes slurred or confused.      You have arm or leg weakness, loss of feeling, or new problems with coordination.      Your pupils are larger than usual, or one pupil is a different size than the other.      You have blood or clear fluid coming out of your ears or nose.    Return to the emergency department if:     You have repeated or forceful vomiting.      You feel confused.      Your headache gets worse or becomes severe.      You or someone caring for you notices that you are harder to wake than usual.    Call your doctor if:     Your symptoms last longer than 6 weeks after the injury.      You have questions or concerns about your condition or care.    Medicines:     Acetaminophen decreases pain and fever. It is available without a doctor's order. Ask how much to take and how often to take it. Follow directions. Read the labels of all other medicines you are using to see if they also contain acetaminophen, or ask your doctor or pharmacist. Acetaminophen can cause liver damage if not taken correctly. Do not use more than 4 grams (4,000 milligrams) total of acetaminophen in one day.       Take your medicine as directed. Contact your healthcare provider if you think your medicine is not helping or if you have side effects. Tell him or her if you are allergic to any medicine. Keep a list of the medicines, vitamins, and herbs you take. Include the amounts, and when and why you take them. Bring the list or the pill bottles to follow-up visits. Carry your medicine list with you in case of an emergency.    Self-care:     Rest or do quiet activities. Limit your time watching TV, using the computer, or doing tasks that require a lot of thinking. Slowly return to your normal activities as directed. Do not play sports or do activities that may cause you to get hit in the head. Ask your healthcare provider when you can return to sports.      Apply ice on your head for 15 to 20 minutes every hour or as directed. Use an ice pack, or put crushed ice in a plastic bag. Cover it with a towel before you apply it to your skin. Ice helps prevent tissue damage and decreases swelling and pain.      Have someone stay with you for 24 hours , or as directed. This person can monitor you for problems and call for help if needed. When you are awake, the person should ask you a few questions every few hours to see if you are thinking clearly. An example is to ask your name or address.    Prevent another head injury:     Wear a helmet that fits properly. Do this when you play sports, or ride a bike, scooter, or skateboard. Helmets help decrease your risk for a serious head injury. Talk to your healthcare provider about other ways you can protect yourself if you play sports.      Wear your seatbelt every time you are in a car. This helps lower your risk for a head injury if you are in a car accident.    Follow up with your doctor as directed: Write down your questions so you remember to ask them during your visits.    Syncope    WHAT YOU NEED TO KNOW:    Syncope is also called fainting or passing out. Syncope is a sudden, temporary loss of consciousness, followed by a fall from a standing or sitting position. Syncope ranges from not serious to a sign of a more serious condition that needs to be treated. You can control some health conditions that cause syncope. Your healthcare providers can help you create a plan to manage syncope and prevent episodes.    DISCHARGE INSTRUCTIONS:    Seek care immediately if:     You are bleeding because you hit your head when you fainted.     You suddenly have double vision, difficulty speaking, numbness, and cannot move your arms or legs.    You have chest pain and trouble breathing.    You vomit blood or material that looks like coffee grounds.      You see blood in your bowel movement.    Contact your healthcare provider if:     You have new or worsening symptoms.    You have another syncope episode.    You have a headache, fast heartbeat, or feel too dizzy to stand up.    You have questions or concerns about your condition or care.    Medicines:     Medicines may be needed to help your heart pump strongly and regularly. Your healthcare provider may also make changes to any medicines that are causing syncope.     Take your medicine as directed. Contact your healthcare provider if you think your medicine is not helping or if you have side effects. Tell him or her if you are allergic to any medicine. Keep a list of the medicines, vitamins, and herbs you take. Include the amounts, and when and why you take them. Bring the list or the pill bottles to follow-up visits. Carry your medicine list with you in case of an emergency.    Follow up with your healthcare provider as directed: Write down your questions so you remember to ask them during your visits.     Manage syncope:     Keep a record of your syncope episodes. Include your symptoms and your activity before and after the episode. The record can help your healthcare provider find the cause of your syncope and help you manage episodes.    Sit or lie down when needed. This includes when you feel dizzy, your throat is getting tight, and your vision changes. Raise your legs above the level of your heart.    Take slow, deep breaths if you stat to breathe faster with anxiety or fear. This can help decrease dizziness and the feeling that you might faint.     Check your blood pressure often. This is important if you take medicine to lower your blood pressure. Check your blood pressure when you are lying down and when you are standing. Ask how often to check during the day. Keep a record of your blood pressure numbers. Your healthcare provider may use the record to help plan your treatment.How to take a Blood Pressure         Prevent a syncope episode:     Move slowly and let yourself get used to one position before you move to another position. This is very important when you change from a lying or sitting position to a standing position. Take some deep breaths before you stand up from a lying position. Stand up slowly. Sudden movements may cause a fainting spell. Sit on the side of the bed or couch for a few minutes before you stand up. If you are on bedrest, try to be upright for about 2 hours each day, or as directed. Do not lock your legs if you are standing for a long period of time. Move your legs and bend your knees to keep blood flowing.    Follow your healthcare provider's recommendations. Your provider may recommend that you drink more liquids to prevent dehydration. You may also need to have more salt to keep your blood pressure from dropping too low and causing syncope. Your provider will tell you how much liquid and sodium to have each day. He or she will also tell you how much physical activity is safe for you. This will depend on what is causing your syncope.    Watch for signs of low blood sugar. These include hunger, nervousness, sweating, and fast or fluttery heartbeats. Talk with your healthcare provider about ways to keep your blood sugar level steady.    Do not strain if you are constipated. You may faint if you strain to have a bowel movement. Walking is the best way to get your bowels moving. Eat foods high in fiber to make it easier to have a bowel movement. Good examples are high-fiber cereals, beans, vegetables, and whole-grain breads. Prune juice may help make bowel movements softer.    Be careful in hot weather. Heat can cause a syncope episode. Limit activity done outside on hot days. Physical activity in hot weather can lead to dehydration. This can cause an episode.    Alcohol Intoxication    WHAT YOU NEED TO KNOW:    Alcohol intoxication is a harmful physical condition caused when you drink more alcohol than your body can handle. It is also called ethanol poisoning, or being drunk.    DISCHARGE INSTRUCTIONS:    Call your local emergency number (911 in the ) if:     You have sudden trouble breathing or chest pain.    You have a seizure.    You feel sad enough to harm yourself or others.    Call your doctor if:     You have hallucinations (you see or hear things that are not real).    You cannot stop vomiting.      You have questions or concerns about your condition or care.    Recommended alcohol limits:     Men 21 to 64 years should limit alcohol to 2 drinks a day. Do not have more than 4 drinks in 1 day or more than 14 in 1 week.    All women, and men 65 or older should limit alcohol to 1 drink in a day. Do not have more than 3 drinks in 1 day or more than 7 in 1 week. No amount of alcohol is okay during pregnancy.    Manage alcohol use:     Decrease the amount you drink. This can help prevent health problems such as brain, heart, and liver damage, high blood pressure, diabetes, and cancer. If you cannot stop completely, healthcare providers can help you set goals to decrease the amount you drink.    Plan weekly alcohol use. You will be less likely to drink more than the recommended limit if you plan ahead.    Have food when you drink alcohol. Food will prevent alcohol from getting into your system too quickly. Eat before you have your first alcohol drink.    Time your drinks carefully. Have no more than 1 drink in an hour. Have a liquid such as water, coffee, or a soft drink between alcohol drinks.    Do not drive if you have had alcohol. Make sure someone who has not been drinking can help you get home.    Do not drink alcohol if you are taking medicine. Alcohol is dangerous when you combine it with certain medicines, such as acetaminophen or blood pressure medicine. Talk to your healthcare provider about all the medicines you currently take.    For more information:     Alcoholics Anonymous  Web Address: http://www.aa.org    Substance Abuse and Mental Health Services Administration  [address]  [place] [address]  Web Address: http://www.[facility].gov    Follow up with your healthcare provider as directed: Write down your questions so you remember to ask them during your visits.

## 2025-06-28 NOTE — ED PROVIDER NOTE - CARE PLAN
Principal Discharge DX:	Head injury  Secondary Diagnosis:	Syncope  Secondary Diagnosis:	Alcohol intoxication, uncomplicated   1

## 2025-06-28 NOTE — ED PROVIDER NOTE - CLINICAL SUMMARY MEDICAL DECISION MAKING FREE TEXT BOX
24-year-old male with no past medical history brought by friend for head injury and syncope tonight about 1 hour prior to arrival.  Friend states they were drinking a lot of alcohol tonight.  Denies any recreational drug use.  Patient blacked out and fell backwards and hit the back of his head.  Friend states he was unconscious for about 10 seconds.  No convulsions.  Patient states he has some pain in the back of his head.  Denies nausea vomiting.  Denies neck chest abdomen or back pain.    Patient appears mildly intoxicated, has unsteady gait.  He has mild tenderness to the posterior mid scalp.  Partial DDx: Intracranial hemorrhage, skull fracture, head injury, alcohol intoxication.  Will check CT head.  Check EKG rule out arrhythmia. 24-year-old male with no past medical history brought by friend for head injury and syncope tonight about 1 hour prior to arrival.  Friend states they were drinking a lot of alcohol tonight.  Denies any recreational drug use.  Patient blacked out and fell backwards and hit the back of his head.  Friend states he was unconscious for about 10 seconds.  No convulsions.  Patient states he has some pain in the back of his head.  Denies nausea vomiting.  Denies neck chest abdomen or back pain.    Patient appears mildly intoxicated, has unsteady gait.  He has mild tenderness to the posterior mid scalp.  Partial DDx: Intracranial hemorrhage, skull fracture, head injury, alcohol intoxication.  Will check CT head.  Check EKG rule out arrhythmia.    CT head unremarkable except for sinus disease.  No trauma.  Given copy of results advised to follow-up with ENT.  EKG unremarkable.  No arrhythmia.  His friend Dru Torres that brought him in will escort him home.

## 2025-06-28 NOTE — ED ADULT NURSE REASSESSMENT NOTE - NS ED NURSE REASSESS COMMENT FT1
Pt intoxicated. Pt brought to bathroom via wheelchair and assisted by Writer RN. Pt's friend also present.

## 2025-06-28 NOTE — ED PROVIDER NOTE - PHYSICAL EXAMINATION
exam:   General: moderately intoxicated. NAD.   HEENT: eyes perrl, nose normal, mild tenderness posterior midline upper scalp. no hematoma  cor: RRR, s1s2, 2+rad pulses.   lungs: ctabl, no resp distress.   abd: soft, ntnd.   neuro: drowsy, ox3, cn2-12 intact, MILLER, 5/5 strength c nl sensation all extremities, ataxic gait  MSK: no c/t/L spine tenderness. nontender pelvis/hips, FROM hips without pain  Skin: normal, no rash

## 2025-06-28 NOTE — ED PROVIDER NOTE - OBJECTIVE STATEMENT
24-year-old male with no past medical history brought by friend for head injury and syncope tonight about 1 hour prior to arrival.  Friend states they were drinking a lot of alcohol tonight.  Denies any recreational drug use.  Patient blacked out and fell backwards and hit the back of his head.  Friend states he was unconscious for about 10 seconds.  No convulsions.  Patient states he has some pain in the back of his head.  Denies nausea vomiting.  Denies neck chest abdomen or back pain.

## 2025-06-28 NOTE — ED ADULT NURSE NOTE - OBJECTIVE STATEMENT
Pt's friend states Pt hit his head on a metal table while out drinking. No bleeding, swelling or redness noted. Pt states he has "a little" pain to the back of his head. Denies nausea, vomiting.

## 2025-06-28 NOTE — ED PROVIDER NOTE - PATIENT PORTAL LINK FT
You can access the FollowMyHealth Patient Portal offered by [facility] by registering at the following website: [email or web address]. By joining [employer]’s FollowMyHealth portal, you will also be able to view your health information using other applications (apps) compatible with our system.

## 2025-06-28 NOTE — ED ADULT NURSE NOTE - NSFALLRISKINTERV_ED_ALL_ED
Assistance OOB with selected safe patient handling equipment if applicable/Assistance with ambulation/Communicate fall risk and risk factors to all staff, patient, and family/Monitor gait and stability/Monitor for mental status changes and reorient to person, place, and time, as needed/Provide visual cue: yellow wristband, yellow gown, etc/Reinforce activity limits and safety measures with patient and family/Toileting schedule using arm’s reach rule for commode and bathroom/Use of alarms - bed, stretcher, chair and/or video monitoring/Call bell, personal items and telephone in reach/Instruct patient to call for assistance before getting out of bed/chair/stretcher/Non-slip footwear applied when patient is off stretcher/[place] to call system/Physically safe environment - no spills, clutter or unnecessary equipment/Purposeful Proactive Rounding/Room/bathroom lighting operational, light cord in reach

## 2025-07-09 ENCOUNTER — APPOINTMENT (OUTPATIENT)
Dept: GASTROENTEROLOGY | Facility: CLINIC | Age: 25
End: 2025-07-09